# Patient Record
Sex: MALE | Race: BLACK OR AFRICAN AMERICAN | NOT HISPANIC OR LATINO | Employment: UNEMPLOYED | URBAN - METROPOLITAN AREA
[De-identification: names, ages, dates, MRNs, and addresses within clinical notes are randomized per-mention and may not be internally consistent; named-entity substitution may affect disease eponyms.]

---

## 2023-07-10 ENCOUNTER — HOSPITAL ENCOUNTER (EMERGENCY)
Facility: MEDICAL CENTER | Age: 43
End: 2023-07-10
Attending: EMERGENCY MEDICINE
Payer: MEDICAID

## 2023-07-10 VITALS
RESPIRATION RATE: 16 BRPM | SYSTOLIC BLOOD PRESSURE: 135 MMHG | BODY MASS INDEX: 26.68 KG/M2 | WEIGHT: 170 LBS | HEART RATE: 73 BPM | TEMPERATURE: 98.2 F | DIASTOLIC BLOOD PRESSURE: 95 MMHG | OXYGEN SATURATION: 99 % | HEIGHT: 67 IN

## 2023-07-10 DIAGNOSIS — M54.10 RADICULOPATHY, UNSPECIFIED SPINAL REGION: ICD-10-CM

## 2023-07-10 PROCEDURE — 99282 EMERGENCY DEPT VISIT SF MDM: CPT

## 2023-07-10 RX ORDER — CYCLOBENZAPRINE HCL 10 MG
10 TABLET ORAL 3 TIMES DAILY PRN
Qty: 15 TABLET | Refills: 0 | Status: SHIPPED | OUTPATIENT
Start: 2023-07-10

## 2023-07-10 RX ORDER — IBUPROFEN 800 MG/1
800 TABLET ORAL EVERY 8 HOURS PRN
Qty: 30 TABLET | Refills: 0 | Status: SHIPPED | OUTPATIENT
Start: 2023-07-10

## 2023-07-10 NOTE — ED PROVIDER NOTES
"ED Provider Note    CHIEF COMPLAINT  Chief Complaint   Patient presents with    Back Pain     Pt reports to non traumatic neck and back pain x 6 months. Pt states it is causing his right arm and leg to become numb when he wakes in the morning and slowly gets better through out the day.     Neck Pain       EXTERNAL RECORDS REVIEWED  None none    HPI/ROS  LIMITATION TO HISTORY   None  OUTSIDE HISTORIAN(S):  None    Jalil Galindo is a 43 y.o. male who presents here for evaluation of upper neck and back pain for 6 months.  Patient states that he is unsure if he did anything in particular, and states that he recently moved here, and \"wanted to get everything looked at.\"  He has no fever chills or vomiting, he denies any IV drug use, he states that he will have intermittent numbness to the right shoulder, but not every day.  He states that as he gets up if that does occur, gets better after movement and when he is up and around.  Patient denies any fall or trauma.  He states that he does not have a physician that he is seen for the same thing.    PAST MEDICAL HISTORY   No bleeding disorders    SURGICAL HISTORY  patient denies any surgical history    FAMILY HISTORY  History reviewed. No pertinent family history.    SOCIAL HISTORY  Social History     Tobacco Use    Smoking status: Every Day     Packs/day: 0.50     Types: Cigarettes    Smokeless tobacco: Never   Substance and Sexual Activity    Alcohol use: Yes     Comment: occ    Drug use: Never    Sexual activity: Not on file       CURRENT MEDICATIONS  Home Medications       Reviewed by Sharona Mckeon R.N. (Registered Nurse) on 07/10/23 at 1506  Med List Status: Partial     Medication Last Dose Status        Patient Mhaad Taking any Medications                           ALLERGIES  Allergies   Allergen Reactions    Penicillins Rash     Reports to itching        PHYSICAL EXAM  VITAL SIGNS: BP (!) 147/92   Pulse 84   Temp 36.3 °C (97.3 °F) (Temporal)   Resp 16  " " Ht 1.702 m (5' 7\")   Wt 77.1 kg (170 lb)   SpO2 98%   BMI 26.63 kg/m²    Constitutional: Well developed, well nourished. No acute distress.  HEENT: Normocephalic, atraumatic. Posterior pharynx clear and moist.  Eyes:  EOMI. Normal sclera.  Neck: Supple, Full range of motion, nontender.  Back: No CVA tenderness, nontender midline, no step offs.  Musculoskeletal: No deformity, no edema, neurovascular intact.   Neuro: Clear speech, appropriate, cooperative, cranial nerves II-XII grossly intact.  Psych: Normal mood and affect      DIAGNOSTIC STUDIES / PROCEDURES  None    RADIOLOGY  None    COURSE & MEDICAL DECISION MAKING  See below    INITIAL ASSESSMENT, COURSE AND PLAN  Care Narrative: At this time, the patient has intermittent upper neck and back pain that have been ongoing for approximately 6 or so months.  He denies any fall or trauma.  He states that he came in here to get MRI, and I Splane him that we do not do MRIs from the ER, but he states he is okay following up outpatient.  This is an ongoing issue, he denies IV drug use, and has no fever or chills.    DISPOSITION AND DISCUSSIONS  I have discussed management of the patient with the following physicians and [facility]: None    Discussion of management with other Q or appropriate source(s): None    Escalation of care considered, and ultimately not performed: None    Barriers to care at this time, including but not limited to: Patient has no PCP.     Decision tools and prescription drugs considered including, but not limited to: None.    FINAL DIAGNOSIS  1. Radiculopathy, unspecified spinal region           Electronically signed by: Gabriel Healy D.O., 7/10/2023 4:41 PM      "

## 2023-07-10 NOTE — ED TRIAGE NOTES
"Jalil Galindo  43 y.o.    Chief Complaint   Patient presents with    Back Pain     Pt reports to non traumatic neck and back pain x 6 months. Pt states it is causing his right arm and leg to become numb when he wakes in the morning and slowly gets better through out the day.     Neck Pain       BP (!) 147/92   Pulse 84   Temp 36.3 °C (97.3 °F) (Temporal)   Resp 16   Ht 1.702 m (5' 7\")   Wt 77.1 kg (170 lb)   SpO2 98%   BMI 26.63 kg/m²     Pt placed in the lobby and educated to alert staff with any changes or concerns.   "

## 2024-03-17 ENCOUNTER — OFFICE VISIT (OUTPATIENT)
Dept: URGENT CARE | Facility: CLINIC | Age: 44
End: 2024-03-17
Payer: MEDICAID

## 2024-03-17 VITALS
DIASTOLIC BLOOD PRESSURE: 78 MMHG | TEMPERATURE: 98.2 F | OXYGEN SATURATION: 97 % | RESPIRATION RATE: 12 BRPM | HEIGHT: 67 IN | HEART RATE: 78 BPM | WEIGHT: 171.9 LBS | BODY MASS INDEX: 26.98 KG/M2 | SYSTOLIC BLOOD PRESSURE: 124 MMHG

## 2024-03-17 DIAGNOSIS — Z51.89 VISIT FOR WOUND CHECK: ICD-10-CM

## 2024-03-17 PROCEDURE — 3078F DIAST BP <80 MM HG: CPT

## 2024-03-17 PROCEDURE — 99202 OFFICE O/P NEW SF 15 MIN: CPT

## 2024-03-17 PROCEDURE — 3074F SYST BP LT 130 MM HG: CPT

## 2024-03-17 ASSESSMENT — ENCOUNTER SYMPTOMS
FEVER: 0
CHILLS: 0

## 2024-03-17 NOTE — LETTER
[facility] [place] [id number] [facility] [id number]     March 17, 2024    Patient: Jalil Galindo   YOB: 1980   Date of Visit: 3/17/2024       To Whom It May Concern:    Jalil Galindo was seen and treated in our department on 3/17/2024.     Sincerely,     SOM Alvarez.
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
## 2024-05-02 ENCOUNTER — APPOINTMENT (OUTPATIENT)
Dept: URGENT CARE | Facility: CLINIC | Age: 44
End: 2024-05-02
Payer: MEDICAID

## 2024-10-13 ENCOUNTER — OFFICE VISIT (OUTPATIENT)
Dept: URGENT CARE | Facility: CLINIC | Age: 44
End: 2024-10-13
Payer: MEDICAID

## 2024-10-13 VITALS
WEIGHT: 175.1 LBS | HEART RATE: 77 BPM | SYSTOLIC BLOOD PRESSURE: 114 MMHG | BODY MASS INDEX: 27.48 KG/M2 | DIASTOLIC BLOOD PRESSURE: 82 MMHG | OXYGEN SATURATION: 99 % | RESPIRATION RATE: 20 BRPM | HEIGHT: 67 IN | TEMPERATURE: 98.7 F

## 2024-10-13 DIAGNOSIS — R10.13 DYSPEPSIA: ICD-10-CM

## 2024-10-13 DIAGNOSIS — R11.2 NAUSEA AND VOMITING, UNSPECIFIED VOMITING TYPE: ICD-10-CM

## 2024-10-13 PROCEDURE — 3074F SYST BP LT 130 MM HG: CPT | Performed by: PHYSICIAN ASSISTANT

## 2024-10-13 PROCEDURE — 3079F DIAST BP 80-89 MM HG: CPT | Performed by: PHYSICIAN ASSISTANT

## 2024-10-13 PROCEDURE — 99214 OFFICE O/P EST MOD 30 MIN: CPT | Performed by: PHYSICIAN ASSISTANT

## 2024-10-13 RX ORDER — ONDANSETRON 2 MG/ML
4 INJECTION INTRAMUSCULAR; INTRAVENOUS ONCE
Status: COMPLETED | OUTPATIENT
Start: 2024-10-13 | End: 2024-10-13

## 2024-10-13 RX ORDER — ONDANSETRON 4 MG/1
4 TABLET, ORALLY DISINTEGRATING ORAL EVERY 6 HOURS PRN
Qty: 20 TABLET | Refills: 0 | Status: SHIPPED | OUTPATIENT
Start: 2024-10-13

## 2024-10-13 RX ORDER — SUCRALFATE 1 G/1
1 TABLET ORAL
Qty: 15 TABLET | Refills: 0 | Status: SHIPPED | OUTPATIENT
Start: 2024-10-13 | End: 2024-10-18

## 2024-10-13 RX ORDER — ONDANSETRON 4 MG/1
4 TABLET, ORALLY DISINTEGRATING ORAL ONCE
Status: DISCONTINUED | OUTPATIENT
Start: 2024-10-13 | End: 2024-10-13

## 2024-10-13 RX ADMIN — ONDANSETRON 4 MG: 2 INJECTION INTRAMUSCULAR; INTRAVENOUS at 12:14

## 2024-10-13 ASSESSMENT — ENCOUNTER SYMPTOMS
DIZZINESS: 0
BLOOD IN STOOL: 0
HEARTBURN: 1
CHILLS: 0
DIAPHORESIS: 0
NAUSEA: 1
PALPITATIONS: 0
WEIGHT LOSS: 0
HEADACHES: 0
VOMITING: 1
FEVER: 0
CONSTIPATION: 0
ABDOMINAL PAIN: 1
DIARRHEA: 0